# Patient Record
(demographics unavailable — no encounter records)

---

## 2025-07-17 NOTE — PHYSICAL EXAM
[Appropriately responsive] : appropriately responsive [Alert] : alert [No Acute Distress] : no acute distress [No Lymphadenopathy] : no lymphadenopathy [Oriented x3] : oriented x3 [Examination Of The Breasts] : a normal appearance [No Discharge] : no discharge [No Masses] : no breast masses were palpable [Labia Majora] : normal [Labia Minora] : normal [Normal] : normal [Uterine Adnexae] : normal [FreeTextEntry4] : Moderate amount of vaginal discharge: culture obtained [FreeTextEntry5] : no IUD string visualized

## 2025-07-17 NOTE — DISCUSSION/SUMMARY
[FreeTextEntry1] : 1) pap performed 2) pt counseled that Mirena IUD effective up until 12/2026 3) pt reporting intermenstrual bleeding  and IUD string not visualized: advised to f/u for TV sono for further eval 4) pt with significant vaginal discharge; vaginal culture obtained  will f/u pending any abnormal results and receipt of pelvic sono results

## 2025-07-17 NOTE — HISTORY OF PRESENT ILLNESS
[Currently Active] : currently active [TextBox_4] : Paz is a 27 y/o  who presents today for an annual exam. She states she had the Mirena IUD inserted after the birth of her last child, 2018.  She was told on 24 by another provider that the IUD had  and needed to be removed.  This attempt on 24 was unsuccessful. We reviewed today most recent literature on  website that Mirena is efffective for up to 8 years, thus due to be removed 2026  She is due for a pap.    She currently reports heavy menses on days 4-5, menses lasting 4-5 days. She also reports intermenstrual bleeding as of 2025, monthly   A pelvic sonogram from 24 notes taht the IUD placement appears normal and endometrium measured 0.8cm [FreeTextEntry1] : 7/16/25 [FreeTextEntry3] : Mirena IUD